# Patient Record
Sex: FEMALE | Race: BLACK OR AFRICAN AMERICAN | Employment: OTHER | URBAN - METROPOLITAN AREA
[De-identification: names, ages, dates, MRNs, and addresses within clinical notes are randomized per-mention and may not be internally consistent; named-entity substitution may affect disease eponyms.]

---

## 2020-08-14 ENCOUNTER — HOSPITAL ENCOUNTER (EMERGENCY)
Facility: HOSPITAL | Age: 40
Discharge: HOME | End: 2020-08-14
Attending: EMERGENCY MEDICINE
Payer: MEDICAID

## 2020-08-14 VITALS
DIASTOLIC BLOOD PRESSURE: 68 MMHG | TEMPERATURE: 99 F | SYSTOLIC BLOOD PRESSURE: 118 MMHG | HEART RATE: 95 BPM | WEIGHT: 240 LBS | BODY MASS INDEX: 35.55 KG/M2 | RESPIRATION RATE: 16 BRPM | HEIGHT: 69 IN | OXYGEN SATURATION: 99 %

## 2020-08-14 DIAGNOSIS — L02.91 ABSCESS: Primary | ICD-10-CM

## 2020-08-14 PROCEDURE — 87186 SC STD MICRODIL/AGAR DIL: CPT | Performed by: EMERGENCY MEDICINE

## 2020-08-14 PROCEDURE — 87077 CULTURE AEROBIC IDENTIFY: CPT | Performed by: EMERGENCY MEDICINE

## 2020-08-14 PROCEDURE — 87070 CULTURE OTHR SPECIMN AEROBIC: CPT | Performed by: EMERGENCY MEDICINE

## 2020-08-14 PROCEDURE — 10061 I&D ABSCESS COMP/MULTIPLE: CPT

## 2020-08-14 PROCEDURE — 87205 SMEAR GRAM STAIN: CPT | Performed by: EMERGENCY MEDICINE

## 2020-08-14 PROCEDURE — 99283 EMERGENCY DEPT VISIT LOW MDM: CPT

## 2020-08-14 RX ORDER — SULFAMETHOXAZOLE AND TRIMETHOPRIM 800; 160 MG/1; MG/1
1 TABLET ORAL 2 TIMES DAILY
Qty: 14 TABLET | Refills: 0 | Status: SHIPPED | OUTPATIENT
Start: 2020-08-14 | End: 2020-08-21

## 2020-08-14 RX ORDER — IBUPROFEN 600 MG/1
600 TABLET ORAL ONCE
Status: COMPLETED | OUTPATIENT
Start: 2020-08-14 | End: 2020-08-14

## 2020-08-14 RX ORDER — IBUPROFEN 400 MG/1
400 TABLET ORAL EVERY 6 HOURS PRN
Qty: 15 TABLET | Refills: 0 | Status: SHIPPED | OUTPATIENT
Start: 2020-08-14 | End: 2021-02-25 | Stop reason: ALTCHOICE

## 2020-08-14 NOTE — CM/SW NOTE
Provided patient with a A referral. She has [employer]. Informed her that [facility] accepts medicaid. She will call once she leaves [place] ER [place] [facility] questions answered.

## 2020-08-14 NOTE — ED PROVIDER NOTES
Patient Seen in: [facility] Emergency Department      History   Patient presents with:  Abscess    Stated Complaint: right axillary abscess    HPI    This is a 20-year-old obese female with no significant past medical history who presents with right was anesthetized with lidocaine. A vertical incision was performed with an 11 blade. Pus was expressed and loculations were freed. The abscess cavity was irrigated copiously. Wound was packed with iodoform. Light dressing applied topically.       Grayson Whitaker

## 2020-08-14 NOTE — ED INITIAL ASSESSMENT (HPI)
Patient shaved armpit approximately 7 days ago. A day or so later armpit started hurting and has continued to hurt and grown in size. Patient had the same thing a while back but was able to take care of at home.

## 2022-06-08 ENCOUNTER — APPOINTMENT (OUTPATIENT)
Dept: OTHER | Facility: HOSPITAL | Age: 42
End: 2022-06-08
Attending: PREVENTIVE MEDICINE

## 2022-06-10 ENCOUNTER — APPOINTMENT (OUTPATIENT)
Dept: OTHER | Facility: HOSPITAL | Age: 42
End: 2022-06-10
Attending: PREVENTIVE MEDICINE

## 2022-06-25 ENCOUNTER — HOSPITAL ENCOUNTER (EMERGENCY)
Facility: HOSPITAL | Age: 42
Discharge: HOME | End: 2022-06-25
Attending: EMERGENCY MEDICINE
Payer: MEDICAID

## 2022-06-25 VITALS
HEIGHT: 69 IN | DIASTOLIC BLOOD PRESSURE: 80 MMHG | OXYGEN SATURATION: 99 % | RESPIRATION RATE: 16 BRPM | WEIGHT: 207 LBS | TEMPERATURE: 98 F | HEART RATE: 80 BPM | SYSTOLIC BLOOD PRESSURE: 120 MMHG | BODY MASS INDEX: 30.66 KG/M2

## 2022-06-25 DIAGNOSIS — J02.0 STREP PHARYNGITIS: Primary | ICD-10-CM

## 2022-06-25 LAB — SARS-COV-2 RNA RESP QL NAA+PROBE: NOT DETECTED

## 2022-06-25 PROCEDURE — 96372 THER/PROPH/DIAG INJ SC/IM: CPT

## 2022-06-25 PROCEDURE — 99283 EMERGENCY DEPT VISIT LOW MDM: CPT

## 2022-06-25 RX ORDER — DEXAMETHASONE 4 MG/1
10 TABLET ORAL ONCE
Status: COMPLETED | OUTPATIENT
Start: 2022-06-25 | End: 2022-06-25

## 2022-06-25 NOTE — ED INITIAL ASSESSMENT (HPI)
PT PRESENTS TO ED WITH SORE THROAT, HEADACHE AND RIGHT EAR PAIN THAT STARTED YESTERDAY, PT STATES SHE HAD A FEVER  AND TOOK IBUPROFEN AT 0400 THIS MORNING

## 2022-08-11 ENCOUNTER — HOSPITAL ENCOUNTER (EMERGENCY)
Facility: HOSPITAL | Age: 42
Discharge: HOME | End: 2022-08-11
Attending: EMERGENCY MEDICINE
Payer: MEDICAID

## 2022-08-11 VITALS
DIASTOLIC BLOOD PRESSURE: 78 MMHG | RESPIRATION RATE: 16 BRPM | HEIGHT: 69 IN | WEIGHT: 208 LBS | HEART RATE: 87 BPM | OXYGEN SATURATION: 100 % | SYSTOLIC BLOOD PRESSURE: 129 MMHG | BODY MASS INDEX: 30.81 KG/M2 | TEMPERATURE: 99 F

## 2022-08-11 DIAGNOSIS — L02.419 AXILLARY ABSCESS: Primary | ICD-10-CM

## 2022-08-11 PROCEDURE — 10060 I&D ABSCESS SIMPLE/SINGLE: CPT

## 2022-08-11 PROCEDURE — 99283 EMERGENCY DEPT VISIT LOW MDM: CPT

## 2022-08-11 RX ORDER — CLINDAMYCIN HYDROCHLORIDE 150 MG/1
300 CAPSULE ORAL ONCE
Status: COMPLETED | OUTPATIENT
Start: 2022-08-11 | End: 2022-08-11

## 2022-08-11 RX ORDER — CLINDAMYCIN HYDROCHLORIDE 300 MG/1
600 CAPSULE ORAL 3 TIMES DAILY
Qty: 60 CAPSULE | Refills: 0 | Status: SHIPPED | OUTPATIENT
Start: 2022-08-11 | End: 2022-08-21

## 2022-08-11 RX ORDER — IBUPROFEN 600 MG/1
600 TABLET ORAL ONCE
Status: COMPLETED | OUTPATIENT
Start: 2022-08-11 | End: 2022-08-11

## 2022-08-12 NOTE — ED INITIAL ASSESSMENT (HPI)
Pt presents to ED for abscess to left armpit since Monday. Pt states foul smelling, swollen & painful.

## 2023-08-16 ENCOUNTER — OCC HEALTH (OUTPATIENT)
Dept: OTHER | Facility: HOSPITAL | Age: 43
End: 2023-08-16
Attending: PREVENTIVE MEDICINE

## 2023-08-16 DIAGNOSIS — Z02.89 VISIT FOR OCCUPATIONAL HEALTH EXAMINATION: Primary | ICD-10-CM

## 2023-08-16 LAB
HBV SURFACE AB SER QL: NONREACTIVE
HBV SURFACE AB SERPL IA-ACNC: 4.53 MIU/ML

## 2023-08-16 PROCEDURE — 86480 TB TEST CELL IMMUN MEASURE: CPT

## 2023-08-16 PROCEDURE — 86706 HEP B SURFACE ANTIBODY: CPT

## 2023-08-18 LAB
M TB IFN-G CD4+ T-CELLS BLD-ACNC: 0.04 IU/ML
M TB TUBERC IFN-G BLD QL: NEGATIVE
M TB TUBERC IGNF/MITOGEN IGNF CONTROL: >10 IU/ML
QFT TB1 AG MINUS NIL: 0.02 IU/ML
QFT TB2 AG MINUS NIL: 0.03 IU/ML

## 2023-10-23 ENCOUNTER — HOSPITAL ENCOUNTER (OUTPATIENT)
Facility: HOSPITAL | Age: 43
Setting detail: HOSPITAL OUTPATIENT SURGERY
Discharge: HOME | End: 2023-10-23
Attending: SURGERY | Admitting: SURGERY

## 2023-10-23 VITALS
HEART RATE: 77 BPM | HEIGHT: 69 IN | SYSTOLIC BLOOD PRESSURE: 134 MMHG | DIASTOLIC BLOOD PRESSURE: 80 MMHG | BODY MASS INDEX: 34.96 KG/M2 | RESPIRATION RATE: 18 BRPM | OXYGEN SATURATION: 100 % | TEMPERATURE: 97 F | WEIGHT: 236 LBS

## 2023-10-23 LAB — B-HCG UR QL: NEGATIVE

## 2023-10-23 PROCEDURE — 81025 URINE PREGNANCY TEST: CPT

## 2023-10-23 RX ORDER — ACETAMINOPHEN 500 MG
1000 TABLET ORAL ONCE
Status: DISCONTINUED | OUTPATIENT
Start: 2023-10-23 | End: 2023-10-23

## 2023-10-23 RX ORDER — SCOLOPAMINE TRANSDERMAL SYSTEM 1 MG/1
1 PATCH, EXTENDED RELEASE TRANSDERMAL ONCE
Status: DISCONTINUED | OUTPATIENT
Start: 2023-10-23 | End: 2023-10-23

## 2023-10-23 RX ORDER — HEPARIN SODIUM 5000 [USP'U]/ML
5000 INJECTION, SOLUTION INTRAVENOUS; SUBCUTANEOUS ONCE
Status: DISCONTINUED | OUTPATIENT
Start: 2023-10-23 | End: 2023-10-23

## 2023-10-23 RX ORDER — HEPARIN SODIUM 5000 [USP'U]/ML
INJECTION, SOLUTION INTRAVENOUS; SUBCUTANEOUS
Status: DISCONTINUED
Start: 2023-10-23 | End: 2023-10-23 | Stop reason: WASHOUT

## 2023-10-23 RX ORDER — VANCOMYCIN HYDROCHLORIDE
15 ONCE
Status: DISCONTINUED | OUTPATIENT
Start: 2023-10-23 | End: 2023-10-23

## 2023-10-23 RX ORDER — SODIUM CHLORIDE, SODIUM LACTATE, POTASSIUM CHLORIDE, CALCIUM CHLORIDE 600; 310; 30; 20 MG/100ML; MG/100ML; MG/100ML; MG/100ML
INJECTION, SOLUTION INTRAVENOUS CONTINUOUS
Status: DISCONTINUED | OUTPATIENT
Start: 2023-10-23 | End: 2023-10-23

## 2023-10-23 NOTE — OR PREOP
Dr Enedina álvarez in pre-op to see patient. Noted that her posterior cyst on her neck was draining .  Therefore, he cancelled surgery  plan is to put her on an antibiotic and re-evaluate her in the office

## 2023-10-23 NOTE — H&P
HPI:    Scott Landers is a 44year old female who presents for evaluation of sebaceous cyst posterior neck. Patient states that she underwent incision and drainage of this a couple years ago. This has returned. She underwent incision and drainage of a skin infection under her left arm about 10 days ago. She is now finishing oral Augmentin. No drainage from her posterior neck cyst.    Past Medical History:  Diagnosis Date  Allergic rhinitis  zyrtec  Vitamin D insufficiency    Past Surgical History:  Procedure Laterality Date  ANESTH, SECTION     TUBAL LIGATION 3741  UMBILICAL HERNIA REPAIR 5434    Current Outpatient Medications  Medication Sig Dispense Refill  amoxicillin clavulanate 875-125 MG Oral Tab Take 1 tablet by mouth As Directed. HYDROcodone-acetaminophen 5-325 MG Oral Tab Take 1 tablet by mouth every 6 (six) hours as needed. diphenhydrAMINE-APAP, sleep, (TYLENOL PM EXTRA STRENGTH OR) Take by mouth. Wound Dressings (FOAM DRESSING BORDERED) External Pads Apply topically. 1    Keflex [Cephalexin] HIVES    Family History  Problem Relation Age of Onset  Kidney Disease Mother  ckd  Sickle Cell Father  No Known Problems Daughter  No Known Problems Son  No Known Problems Maternal Grandmother  No Known Problems Sister    Social History  Tobacco Use  Smoking status: Every Day  Packs/day: 0.25  Years: 5.00  Additional pack years: 0.00  Total pack years: 1.25  Types: Cigarettes  Smokeless tobacco: Never  Tobacco comments: 4-5 cig/d since   Vaping Use  Vaping Use: Never used  Alcohol use: Yes  Comment: 3 x mon 1 drink  Drug use: Never    ROS:    10 point review of systems performed with pertinent positives and negatives per history of present illness    GENERAL: well developed, well nourished female, in no apparent distress  SKIN: anicteric  HEENT: normocephalic, sclera aniteric  NECK: supple, sebaceous cyst posterior neck measuring 3 x 3 cm with an overlying surgical scar.  Mild tenderness but no fluctuance. No cellulitis or erythema  RESPIRATORY: clear to auscultation  CARDIOVASCULAR: RRR  ABDOMEN: normal active BS, soft and no tenderness, no mass  LYMPHATIC: no lymphadenopathy  EXTREMITIES: no cyanosis or edema    IMPRESSION/PLAN:    Posterior neck sebaceous cyst: Excised under sedation. The risk, benefits and alternatives were discussed in detail. Patient voiced understanding and willing to proceed. Would allow about 4 weeks to allow her left axillary infection to resolve.

## (undated) DEVICE — APPLICATOR SKIN PREP 26ML HI LT ORNG 2% CHG

## (undated) DEVICE — STERILE POLYISOPRENE POWDER-FREE SURGICAL GLOVES: Brand: PROTEXIS

## (undated) DEVICE — SOLUTION IRRIG 1000ML 0.9% NACL USP BTL

## (undated) DEVICE — VIOLET BRAIDED (POLYGLACTIN 910), SYNTHETIC ABSORBABLE SUTURE: Brand: COATED VICRYL

## (undated) DEVICE — MINI LAP PACK-LF: Brand: MEDLINE INDUSTRIES, INC.

## (undated) DEVICE — SLEEVE COMPR M KNEE LEN SGL USE KENDALL SCD

## (undated) DEVICE — SUTURE VCRL SZ 2-0 L18IN ABSRB UD POLYGLACTIN

## (undated) DEVICE — ADHESIVE SKIN TOP FOR WND CLSR DERMBND ADV

## (undated) DEVICE — SPONGE GZ 4XL4IN 100% COT 12 PLY TYP VII WVN

## (undated) DEVICE — UNDYED BRAIDED (POLYGLACTIN 910), SYNTHETIC ABSORBABLE SUTURE: Brand: COATED VICRYL

## (undated) DEVICE — SUTURE VCRL SZ 3-0 L27IN ABSRB UD L26MM SH